# Patient Record
Sex: MALE | Race: WHITE
[De-identification: names, ages, dates, MRNs, and addresses within clinical notes are randomized per-mention and may not be internally consistent; named-entity substitution may affect disease eponyms.]

---

## 2017-08-12 NOTE — RADRPT
EXAM DATE/TIME:  08/12/2017 09:48 

 

[facility] COMPARISON:     

No previous studies available for comparison.

 

                     

INDICATIONS :     

Patient states defibrillator went off this morning.

                     

 

MEDICAL HISTORY :            

Cardiovascular disease   

 

SURGICAL HISTORY :        

Defibrillator

 

ENCOUNTER:     

Initial                                        

 

ACUITY:     

1 day      

 

PAIN SCORE:     

0/10

 

LOCATION:     

Bilateral chest 

 

FINDINGS:     

A single AP erect portable view of the chest was obtained and demonstrates a left subclavian AV seque
ntial transvenous pacer in place. The heart size is mildly prominent with no perihilar edema. There a
re no confluent infiltrates or effusions. The bony thorax is intact. There are mild atherosclerotic c
alcifications in the aorta.

 

CONCLUSION:     No acute disease.  

 

 

 

 Adam Pugh MD on August 12, 2017 at 10:24           

Board Certified Radiologist.

 This report was verified electronically.

## 2017-08-12 NOTE — PD
HPI


Chief Complaint:  Cardiac Complaint


Time Seen by Provider:  09:44


Travel History


International Travel<30 days:  No


Contact w/Intl Traveler<30days:  No


Traveled to known affect area:  No





History of Present Illness


HPI


This patient reports that he's been feeling dizzy and lightheaded for the last 

2 days.  He's had no syncope.  This morning he was feeling palpitations and 

dizzy and his defibrillator fired one time.  He has not had any chest pain.  He 

was at the office of his cardiologist Dr. Sher  3 days ago.  Symptoms 

moderately severe.  No alleviating factors.  He denies any syncope today.  

Patient knows he has cardiac rhythm problems but cannot be more specific.





PFSH


Past Medical History


Hx Anticoagulant Therapy:  Yes (WARFARIN DAILY)


Heart Rhythm Problems:  Yes


Cancer:  No


Cardiovascular Problems:  Yes (PACER AND DEFIB    CATH)


High Cholesterol:  No


Chemotherapy:  No


Chest Pain:  Yes


Congestive Heart Failure:  Yes


Cerebrovascular Accident:  No


Coronary Artery Disease:  Yes (PACER/DEFIB)


Diabetes:  No


Diminished Hearing:  No


Endocrine:  No


Gastrointestinal Disorders:  Yes


GERD:  Yes


Glaucoma:  No


Genitourinary:  No


Hepatitis:  No


Hiatal Hernia:  No


Hypertension:  Yes


Implanted Vascular Access Dvce:  Yes


Musculoskeletal:  Yes


Neurologic:  No


Psychiatric:  No


Reproductive:  No


Respiratory:  Yes (ASTHMA)


Integumentary:  No


Radiation Therapy:  No


Seizures:  No


Thyroid Disease:  No


Ulcer:  Yes





Past Surgical History


Abdominal Surgery:  No


AICD:  Yes


Cardiac Surgery:  Yes (PACE/DEFIB)


Eye Surgery:  Yes (BILAT CATARACT)


Genitourinary Surgery:  No


Thoracic Surgery:  No


Other Surgery:  Yes (SINIS)





Social History


Alcohol Use:  Yes (OCC)


Tobacco Use:  No


Substance Use:  No





Allergies-Medications


(Allergen,Severity, Reaction):  


Coded Allergies:  


     Nitrofurantoin (Verified  Allergy, Intermediate, Itching, 8/12/17)


Uncoded Allergies:  


     ANTIBIOTIC (Adverse Reaction, Intermediate, Rash, 8/12/17)


 PT STATE HE HAS A REACTION TO AN ANTIBIOTIC BUT CANNOT


 REMEMBER THE NAME


Reported Meds & Prescriptions





Reported Meds & Active Scripts


Active


Fioricet Tab (Acetaminophen/Butalbital/Caffeine) 1 Tab Tab 1-2 Tab PO Q6H PRN


Reported


Deltasone 10 Mg Tab (Prednisone) 10 Mg Tab 10 Mg PO DAILY 


Flexeril (Cyclobenzaprine HCl) 10 Mg Tab 10 Mg PO TID PRN


Fioricet Tab (Acetaminophen/Butalbital/Caffeine) 1 Tab Tab 1 Tab PO Q6H PRN


Lasix 20 Mg  Tab (Furosemide) 20 Mg Tab 20 Mg PO DAILY 


Omeprazole 20 Mg Tab 20 Mg PO DAILY 


Coumadin (Warfarin Sodium) 3 Mg Tab 1.5 Mg PO FRIDAY 


Coumadin (Warfarin Sodium) 3 Mg Tab 3 Mg PO DAILY EXCEPT FRIDAY 


Toprol Xl 25 mg Tab (Metoprolol Succinate) 25 Mg Tabcr 50 Mg PO HS 


Fosinopril Sodium 20 Mg Tab 20 Mg PO BID 


Spironolactone 25 Mg Tab 25 Mg PO BID 








Review of Systems


General / Constitutional:  No: Fever


Eyes:  No: Visual changes


HENT:  Positive: Lightheadedness,  No: Headaches


Cardiovascular:  Positive: Palpitations, Irregular Rhythm,  No: Chest Pain or 

Discomfort


Respiratory:  No: Shortness of Breath


Gastrointestinal:  No: Abdominal Pain


Genitourinary:  No: Dysuria


Musculoskeletal:  No: Pain


Skin:  No Rash


Neurologic:  Positive: Dizziness,  No: Weakness


Psychiatric:  No: Depression


Endocrine:  No: Polydipsia


Hematologic/Lymphatic:  No: Easy Bruising





Physical Exam


Narrative


GENERAL: Well-nourished, well-developed patient with lightheadedness.


SKIN: Focused skin assessment reveals no rash and nodules. Skin is Warm and dry.


HEAD: Atraumatic. Normocephalic. 


EYES: Pupils equal and round. No scleral icterus. No injection or drainage. 


ENT: No nasal bleeding or discharge.  Mucous membranes pink and moist.


NECK: Trachea midline. No JVD. 


CARDIOVASCULAR: Regular rate and rhythm with frequent ectopic beats.  No murmur 

appreciated.  Heart rate is 90


RESPIRATORY: No accessory muscle use. Clear to auscultation. Breath sounds 

equal bilaterally. 


GASTROINTESTINAL: Abdomen soft, non-tender, nondistended. Hepatic and splenic 

margins not palpable. 


MUSCULOSKELETAL: No obvious deformities. No clubbing.  No cyanosis.  No edema. 


NEUROLOGICAL: Awake and alert. No obvious cranial nerve deficits.  Motor 

grossly within normal limits. Normal speech.


PSYCHIATRIC: Appropriate mood and affect; insight and judgment normal.





Data


Data


Last Documented VS





Vital Signs








  Date Time  Temp Pulse Resp B/P Pulse Ox O2 Delivery O2 Flow Rate FiO2


 


8/12/17 11:19  78 14 113/59 99 Nasal Cannula 2 


 


8/12/17 09:31 97.9       








Orders





 Electrocardiogram (8/12/17 09:49)


Basic Metabolic Panel (Bmp) (8/12/17 09:49)


Complete Blood Count With Diff (8/12/17 09:49)


Magnesium (Mg) (8/12/17 09:49)


Prothrombin Time / Inr (Pt) (8/12/17 09:49)


Chest, Single Ap (8/12/17 09:49)


Ecg Monitoring (8/12/17 09:49)


Iv Access Insert/Monitor (8/12/17 09:49)


Oximetry (8/12/17 09:49)


Oxygen Administration (8/12/17 09:49)


Sodium Chloride 0.9% Flush (Ns Flush) (8/12/17 10:00)


Digoxin (8/12/17 09:59)


Metoprolol Tartrate (Lopressor) (8/12/17 10:15)


Blood Pressure (8/12/17 10:33)


Vital Signs (8/12/17 10:33)


Amiodarone Inj (Cordarone Inj) (8/12/17 10:45)


Amiodarone Inj (Cordarone Inj) (8/12/17 11:00)


Sodium Chloride 0.9% Flush (Ns Flush) (8/12/17 10:45)


Amiodarone Inj (Cordarone Inj) (8/12/17 10:45)


Admit Order (Ed Use Only) (8/12/17 11:15)


Electrocardiogram (8/12/17 )





Labs





 Laboratory Tests








Test 8/12/17





 09:55


 


White Blood Count 9.7 TH/MM3


 


Red Blood Count 4.42 MIL/MM3


 


Hemoglobin 15.5 GM/DL


 


Hematocrit 43.6 %


 


Mean Corpuscular Volume 98.7 FL


 


Mean Corpuscular Hemoglobin 35.1 PG


 


Mean Corpuscular Hemoglobin 35.6 %





Concent 


 


Red Cell Distribution Width 12.8 %


 


Platelet Count 273 TH/MM3


 


Mean Platelet Volume 8.7 FL


 


Neutrophils (%) (Auto) 66.8 %


 


Lymphocytes (%) (Auto) 21.1 %


 


Monocytes (%) (Auto) 10.8 %


 


Eosinophils (%) (Auto) 0.5 %


 


Basophils (%) (Auto) 0.8 %


 


Neutrophils # (Auto) 6.5 TH/MM3


 


Lymphocytes # (Auto) 2.0 TH/MM3


 


Monocytes # (Auto) 1.0 TH/MM3


 


Eosinophils # (Auto) 0.1 TH/MM3


 


Basophils # (Auto) 0.1 TH/MM3


 


CBC Comment DIFF FINAL 


 


Differential Comment  


 


Prothrombin Time 26.8 SEC


 


Prothromb Time International 2.3 RATIO





Ratio 


 


Sodium Level 137 MEQ/L


 


Potassium Level 4.2 MEQ/L


 


Chloride Level 103 MEQ/L


 


Carbon Dioxide Level 25.9 MEQ/L


 


Anion Gap 8 MEQ/L


 


Blood Urea Nitrogen 15 MG/DL


 


Creatinine 1.22 MG/DL


 


Estimat Glomerular Filtration 58 ML/MIN





Rate 


 


Random Glucose 89 MG/DL


 


Calcium Level 8.7 MG/DL


 


Magnesium Level 1.8 MG/DL


 


Digoxin Level 0.6 NG/ML











MDM


Medical Decision Making


Medical Screen Exam Complete:  Yes


Emergency Medical Condition:  Yes


Medical Record Reviewed:  Yes


Differential Diagnosis


Ventricular tachycardia, A. fib, SVT


Narrative Course


I have reviewed the patient's electronic medical record.





IV placed


CBC is normal


Metabolic profile is normal


INR on Coumadin is 2.3


Digoxin level 0.6


Magnesium ordered





I reviewed his EKG which shows sinus rhythm but very frequent PVCs.  He will 

string together 2-3 groups of PVCs.


Patient reports that his defibrillator fired.  We've called [employer] to come 

in and interrogate the device


I've placed a call to his cardiologist to discuss any antiarrhythmic therapy


At current time his pulse is 90 and his blood pressure is 156 systolic





I spoke with cardiologist coverage.  They recommended starting metoprolol 50 

twice a day now.  His med list includes no beta-blockade.





Patient had significant worsening at that point.  He went into frequent spells 

of wide complex ventricular tachycardia anywhere from 10 to the 40 beats.


They did spontaneously terminate


However his defibrillator did not fire during any these events


I spoke with cardiologist a second time.


I have initiated amiodarone drip per protocol


He will be admitted to [facility]


I spoke with hospitalist coverage as well


Critical Care Narrative


Aggregate critical care time was 35 minutes. Time to perform other separately 

billable procedures was not included in the critical care time. My time did not 

include minutes spent treating any other patients simultaneously or on 

activities that did not directly contribute to the patient's treatment.  





The services I provided to this patient were to treat and/or prevent clinically 

significant deterioration that could result in: Cardiopulmonary arrest, 

cardiogenic shock





I provided critical care services requiring my management, as noted below:


Chart data review, documentation time, medication orders and management, vital 

sign assessments/reviewing monitor data, ordering and reviewing lab tests, 

ordering and interpreting/reviewing x-rays and diagnostic studies, care of the 

patient and discussion of the patient with the admitting physicians.





Diagnosis





 Primary Impression:  


 Ventricular tachycardia


 Additional Impressions:  


 Defibrillator discharge


 Lightheadedness





Admitting Information


Admitting Physician Requests:  Admit








Troy Lin MD Aug 12, 2017 09:59

## 2017-08-12 NOTE — MB
cc:

KEREN HINES M.D., BARTON G. DO

****

 

 

DATE OF CONSULTATION:

08/12/2017

 

REASON FOR CONSULTATION:

Cardiology consultation.

 

IMPRESSION

1. Complex ventricular arrhythmias.  The patient received a defibrillator shock

     this morning.

2. Chronic atrial fibrillation, now sinus rhythm after his ICD shock this

     morning.

3. Hypertension.

4. History of congestive heart failure.  The patient tells me he does not have

     atherosclerotic heart disease.

5. History of peptic ulcer disease.

6. Chronic obstructive pulmonary disease with a history of asthma.

7. Chronic warfarin therapy.

 

RECOMMENDATIONS

The patient has been admitted to the hospital.  We have started a amiodarone

drip.  This will be changed to p.o. amiodarone magnesium level was noted to be

1.8.  He will be started on magnesium supplementation.  Will continue warfarin.

INR 2.5-3.5.

 

CLINICAL DATA

Mr. Townsend is a 76-year-old male who was admitted to the emergency room with

complaints of a defibrillator shock.  He apparently was sitting on the couch

this morning when he shock according to his daughter that knocked him off the

couch. I was contacted, the patient still felt dizzy after his one shock, I

advised him to go to the emergency room. We just finished  interrogating his

device and he has had multiple runs of nonsustained ventricular tachycardia.

Many episodes have been pace terminated.  The patient does not know his

ejection fraction he does not know the etiology of this cardiomyopathy.  He

denies a history of myocardial infarction.  He has had no stents or bypass

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  He says Dr. Hines his primary cardiologist has told him that he does have congestive heart

failure.  He apparently saw Dr. Hines recently and had a recent

echocardiogram.

 

ALLERGIES

NITROFURANTOIN

UNSPECIFIED ANTIBIOTIC.

 

MEDICATIONS:

1. At the time of admission included Fioricet

2. prednisone 10 mg daily,

3. Flexeril

4. Furosemide 20 daily

5. Omeprazole 20 daily,

6. Warfarin 9 mg every day except 4-1/2 mg on Friday,

7. metoprolol 25 in the morning and 50 in the evening.

8. Fosinopril 20 b.i.d.

9. Spironolactone 25 b.i.d.

 

HISTORY:

He denies a history of seizure, stroke or TIA.

He has a history of smoking in the remote past, quit over 40 years ago.  He

apparently drinks three to five alcoholic drinks per day.  He and has no

history of asthma.  He has a history of peptic ulcer disease but he has had no

recent bleeding complications from warfarin.

 

He had elevated liver function tests atorvastatin which was discontinued.  He

has no history of kidney problems or thyroid problems.  He has had no recent

chest pain.  He had no syncope.  He did feel dizzy after the shock this morning

but not before the shock. He has had no lower extremity edema. He has had no

abdominal or back pain.

 

PHYSICAL EXAMINATION

IN GENERAL: At this time demonstrates an alert oriented male laying in bed no

apparent distress.  He is in a sinus rhythm / bigeminal rhythm.

VITAL SIGNS: Blood pressure 153/85, heart rate is about 90 at this point.

Saturations 99%.

HEAD, EYES, EARS, NOSE, AND THROAT: Exam anicteric sclerae.  Jugular venous

pressures are not elevated.  No definite bruits.  Noted.

LUNGS: He has clear lung fields.

CARDIAC: On cardiac exam there is a regular rate and rhythm.  There is a fourth

heart sound present.  He has bigeminal rhythm during part of the time.

ABDOMEN:  the abdomen is soft, nontender.

EXTREMITIES: The extremities are free of cyanosis, clubbing, edema.

 

RADIOLOGIC:

A 12-lead electrocardiogram demonstrates sinus rhythm.  There are ventricular

couplets and triplets some multiform there are nonspecific ST-T changes.  There

is a left atrial abnormality.  There is a intermittent ventricular pacing.

 

LABORATORY FINDINGS

White cell count 9,700, hematocrit 44%, platelet count 273, lytes 137, 4.2,

103, 25.9, BUN 15, creatinine 1.2, magnesium 1.8.

 

A single view chest x-ray is unremarkable.

 

Cardiac enzymes are pending.

 

DISCUSSION

The 76-year-old male presumably with a nonischemic dilated cardiomyopathy and

complex of ventricular arrhythmias.  He did have chronic atrial fibrillation on

warfarin and is now in a sinus rhythm after receiving an appropriate shock this

morning.

 

We have reprogrammed his ICD to a DDD mode with a lower rate limit of 70 pulses

per minute a monitoring zone was also put on 130 beats per minute.  The patient

needs to keep his potassium above 4.5, he needs to keep magnesium level above

2.

 

The patient has been started on IV amiodarone with significant decrease in his

ectopy will begin p.o. amiodarone.

 

 

 

                              _________________________________

                              DO Bright Gil

D:  8/12/2017/12:41 PM

T:  8/12/2017/1:13 PM

Visit #:  [id number]

Job #:  [id number]

## 2017-08-12 NOTE — HHI.HP
__________________________________________________





HPI


Service


[facility]


Primary Care Physician


Warren Spencer MD


Admission Diagnosis


wide complex V tach, defib firing


Diagnoses:  


Chief Complaint:  


Dizziness


Lightheadedness


Defibrillator firing


Travel History


International Travel<30 Days:  No


Contact w/Intl Traveler <30 Da:  No


Traveled to Known Affected Are:  No


History of Present Illness


This is a 75 yo male with a past medical history significant for atrial 

fibrillation on Coumadin, nonischemic cardiomyopathy, hypertension, CHF, GERD, 

PUD and asthma who presents to [facility] ED with complaints of 

lightheadedness, dizziness and irregular heart beats for the past month that 

have increased in intensity and frequency over the past 2 days with associated 

defibrillator firing earlier today.  He denies any syncopal episodes.  Patient 

denies any associated chest pain or shortness of breath.  He denies any 

increased swelling in his legs or feet.  He cannot endorse any aggravating or 

alleviating factors.  He denies any recent illness, fever or chills.  He denies 

any associated nausea, vomiting or abdominal pain.  He denies any hematuria, 

dysuria, diarrhea or constipation.  He does admit to drinking alot of alcohol 

last night and eating chicken wings.  Initially in the ED, his EKG showed sinus 

rhythm with frequent PVCs.  Cardiology was contacted and per their 

recommendations was started on Metoprolol 50mg BID.  [employer] was contacted to 

interrogate the patients defibrillator device.  Patient worsened with frequent 

spells of wide complex ventricular tachycardia from 10 to 40 beats which 

spontaneously terminated.  Per ED's note, his defibrillator did not fire during 

any of these events.  ED discussed with cardiologist again and amiodarone drip 

per protocol was initiated.  Patient had some complaints of left shoulder pain 

that improved after the HOB was repositioned.  Per patient report, he was seen 

at his cardiologist office Dr. Sher 3 days ago and underwent an 

echocardiogram.





Review of Systems


Except as stated in HPI:  all other systems reviewed are Neg





Past Family Social History


Past Medical History


Atrial fibrillation on Coumadin


Nonischemic cardiomyopathy


HTN


CHF with EF 25-30%


GERD


PUD


Asthma


Past Surgical History


AICD


Bilateral cataract sx


Sinus sx


Back surgery x 2


Vasectomy


Tonsillectomy


TURP


Reported Medications


Fioricet Tab (Acetaminophen/Butalbital/Caffeine) 1 Tab Tab 1-2 Tab PO Q6H PRN


Deltasone 10 Mg Tab (Prednisone) 10 Mg Tab 10 Mg PO DAILY 


Flexeril (Cyclobenzaprine HCl) 10 Mg Tab 10 Mg PO TID PRN


Fioricet Tab (Acetaminophen/Butalbital/Caffeine) 1 Tab Tab 1 Tab PO Q6H PRN


Lasix 20 Mg  Tab (Furosemide) 20 Mg Tab 20 Mg PO DAILY 


Omeprazole 20 Mg Tab 20 Mg PO DAILY 


Coumadin (Warfarin Sodium) 3 Mg Tab 1.5 Mg PO FRIDAY 


Coumadin (Warfarin Sodium) 3 Mg Tab 3 Mg PO DAILY EXCEPT FRIDAY 


Toprol Xl 25 mg Tab (Metoprolol Succinate) 25 Mg Tabcr 50 Mg PO HS 


Fosinopril Sodium 20 Mg Tab 20 Mg PO BID 


Spironolactone 25 Mg Tab 25 Mg PO BID


Allergies:  


Coded Allergies:  


     Nitrofurantoin (Verified  Allergy, Intermediate, Itching, 8/12/17)


Uncoded Allergies:  


     ANTIBIOTIC (Adverse Reaction, Intermediate, Rash, 8/12/17)


 PT STATE HE HAS A REACTION TO AN ANTIBIOTIC BUT CANNOT


 REMEMBER THE NAME


Active Ordered Medications





 Current Medications








 Medications


  (Trade)  Dose


 Ordered  Sig/Rosibel


 Route  Start Time


 Stop Time Status Last Admin


 


 Amiodarone HCl


 450 mg/Dextrose  250 ml @ 0


 mls/hr  CONTINUOUS


 IV  8/12/17 10:45


    8/12/17 11:34


 


 


  (NS 1000 ml Inj)  1,000 ml @ 


 83 mls/hr  Q12H3M


 IV  8/12/17 12:00


     


 


 


  (NS Flush)  2 ml  UNSCH  PRN


 IV FLUSH  8/12/17 11:30


     


 


 


  (NS Flush)  2 ml  BID


 IV FLUSH  8/12/17 21:00


     


 


 


  (Tylenol)  650 mg  Q4H  PRN


 PO  8/12/17 11:30


     


 


 


  (Zofran Inj)  4 mg  Q6H  PRN


 IVP  8/12/17 11:30


     


 


 


  (Narcan Inj)  0.4 mg  UNSCH  PRN


 IV  8/12/17 11:30


     


 


 


  (Mer-Colace)  1 tab  BID


 PO  8/12/17 21:00


     


 


 


  (Milk Of


 Magnesia Liq)  30 ml  Q12H  PRN


 PO  8/12/17 11:30


     


 


 


  (Senokot)  17.2 mg  Q12H  PRN


 PO  8/12/17 11:30


     


 


 


  (Dulcolax Supp)  10 mg  DAILY  PRN


 RECTAL  8/12/17 11:30


     


 


 


  (Lactulose Liq)  30 ml  DAILY  PRN


 PO  8/12/17 11:30


     


 








Family History


Significant FMHX of coronary artery disease.  Mother, MI, Brother, MI and CVA, 

Son, MI at age 43, previous CABG


Social History


Patient has a remote history of tobacco use of 1ppd starting when he was a 

teenager but quit 40 years ago.


Patient reports alcohol consumption of 3 to 4 bloody bryan's or glasses of wine 

nightly.


Patient denies any illicit drug use.





Physical Exam


Vital Signs





 Vital Signs








  Date Time  Temp Pulse Resp B/P Pulse Ox O2 Delivery O2 Flow Rate FiO2


 


8/12/17 11:34  77  123/70    


 


8/12/17 11:19  78 14 113/59 99 Nasal Cannula 2 


 


8/12/17 11:01  87  99/70    


 


8/12/17 10:47  102 18 153/85 100   


 


8/12/17 10:17  90   99 Nasal Cannula 2 


 


8/12/17 09:31 97.9 112 24 144/87 97 Room Air  








Physical Exam


GENERAL: This is a well-nourished, well-developed patient, in no apparent 

distress.  Awake and alert.  Appears comfortable.


SKIN: No rashes, ecchymoses or lesions. Warm and dry.


HEAD: Atraumatic. Normocephalic. No temporal or scalp tenderness.


EYES: Pupils equal round and reactive. Extraocular motions intact. No scleral 

icterus. No injection or drainage. 


ENT: Nose without bleeding or purulent drainage. Throat without erythema, 

tonsillar hypertrophy or exudate. Uvula midline. Airway patent.


NECK: Trachea midline. No lymphadenopathy. Supple, nontender, no meningeal 

signs.


CARDIOVASCULAR:Irregular without murmurs, gallops, or rubs. 


RESPIRATORY: Clear to auscultation. Breath sounds equal bilaterally. No wheezes

, rales, or rhonchi.  


GASTROINTESTINAL: Abdomen soft, non-tender, nondistended. No hepato-splenomegaly

, or palpable masses. No guarding.


MUSCULOSKELETAL: Extremities without clubbing or cyanosis. Trace bilateral 

lower extremity edema.  No joint tenderness, effusion, or edema noted. No calf 

tenderness. 


NEUROLOGICAL: Awake and alert. Able to move all extremities.  No focal 

neurologic findings appreciated.  Normal speech.


Laboratory





Laboratory Tests








Test 8/12/17





 09:55


 


White Blood Count 9.7 


 


Red Blood Count 4.42 


 


Hemoglobin 15.5 


 


Hematocrit 43.6 


 


Mean Corpuscular Volume 98.7 


 


Mean Corpuscular Hemoglobin 35.1 


 


Mean Corpuscular Hemoglobin 35.6 





Concent 


 


Red Cell Distribution Width 12.8 


 


Platelet Count 273 


 


Mean Platelet Volume 8.7 


 


Neutrophils (%) (Auto) 66.8 


 


Lymphocytes (%) (Auto) 21.1 


 


Monocytes (%) (Auto) 10.8 


 


Eosinophils (%) (Auto) 0.5 


 


Basophils (%) (Auto) 0.8 


 


Neutrophils # (Auto) 6.5 


 


Lymphocytes # (Auto) 2.0 


 


Monocytes # (Auto) 1.0 


 


Eosinophils # (Auto) 0.1 


 


Basophils # (Auto) 0.1 


 


CBC Comment DIFF FINAL 


 


Differential Comment  


 


Prothrombin Time 26.8 


 


Prothromb Time International 2.3 





Ratio 


 


Sodium Level 137 


 


Potassium Level 4.2 


 


Chloride Level 103 


 


Carbon Dioxide Level 25.9 


 


Anion Gap 8 


 


Blood Urea Nitrogen 15 


 


Creatinine 1.22 


 


Estimat Glomerular Filtration 58 





Rate 


 


Random Glucose 89 


 


Calcium Level 8.7 


 


Magnesium Level 1.8 


 


Digoxin Level 0.6 








Result Diagram:  


8/12/17 0955                                                                   

             8/12/17 0955





Imaging





Last Impressions








Chest X-Ray 8/12/17 0949 Signed





Impressions: 





 Service Date/Time:  Saturday, August 12, 2017 09:48 - CONCLUSION: No acute 





 disease.       Adam Pugh MD 











Assessment and Plan


Assessment and Plan


75 yo male with a past medical history significant for atrial fibrillation on 

Coumadin, hypertension, CHF, GERD, PUD and asthma who presents to [facility] ED with complaints of lightheadedness, dizziness and irregular heart 

beats for the past month that have increased in intensity and frequency over 

the past 2 days with associated defibrillator firing earlier today.





Ventricular tachycardia


Defibrillator discharge


Consult cardiology


Continue amiodarone drip


K 4.2, Mag 1.8


Digoxin level 0.6


Cycle cardiac enzymes


Continuous cardiac monitoring


[employer] contacted to interrogate device





Atrial fibrillation on Coumadin


CHF, not in acute exacerbation


Nonischemic cardiomyopathy


HTN


Continue on home dose of Coumadin.  INR 2.3.  Continue to monitor INR.  

Pharmacy to dose.


Monitor for signs of fluid overload.  CXR personally reviewed shows no e/o 

edema or effusions.  Trace lower extremity edema.  Satting 99% on 2L.


Hold home antihypertensive meds for now as patient is normotensive


Continue on home dose of Digoxin


Hold home Torsemide and Aldactone


monitor I&Os


last echocardiogram in the system dated 1/13/14 revealed severely reduced EF 25-

30%, diffuse hypokinesis.  Patient states he recently had echocardiogram done 

on Wednesday at his cardiologist office.





Asthma, not in acute exacerbation


Duonebs as needed





HLD


Continue home Lipitor 10mg daily





DVT prophylaxis


Patient is on Coumadin





Discussed with patient, family and Dr. Johnson


Code Status


Full Code


Discussed Condition With


Discussed with patient in ER and with his Daughter Miss Fitzpatrick


Discussed with Cardiology specialist, asked me to continue Warfarin and 

Amiodarone by mouth 400 mg BID.





Attending Statement


The exam, history, and the medical decision-making described in the above note 

were completed with the assistance of the mid-level provider. I reviewed and 

agree with the findings presented.  I attest that I had a face-to-face 

encounter with the patient on the same day, and personally performed and 

documented my assessment and findings in the medical record.








Kesha Hernandez Aug 12, 2017 12:33


Cosmo Aranda MD Aug 12, 2017 13:49

## 2017-08-13 NOTE — HHI.PR
Subjective


Remarks


This is a pleasant 75 y/o male who came to ER after Defibrillator firing in am 

yesterday. 


Seen by Cardiology specialist Doctor Nichole Horne, with Diagnosis of 

Complex Ventricular arrhythmias, received a defibrillator shock 


Chronic Atrial Fibrillation, now sinus rhythm, after ICD shock, Hypertension, 

PUD, COPD, chronic Warfarin therapy. 


Admitted and started on Amiodarone drip, he will change to by mouth amiodarone, 

the Defibrillator was reprogrammed his ICD


to a DDD mode with lower rate limit of 70 pulses per minute a monitoring zone 

was also put on 130 beats per minute, keep potassium


above 4.5 Magnesium level above 2. Seen in his bedroom, discussed with patient 

and his Nurse Miss Medel his INR 1.8 subtherapeutic in 


a patient with CHADS-VASc Score of 4 will need to receive LMWH 1mg per kilogram 

every 12 hours SQ starting today. 


stable in his bedroom, no nausea, vomit or diarrhea, and sinus rhythm on 

monitor at this time.





Objective





 Vital Signs








  Date Time  Temp Pulse Resp B/P Pulse Ox O2 Delivery O2 Flow Rate FiO2


 


8/13/17 09:00  69      


 


8/13/17 08:00  79      


 


8/13/17 07:00  78      


 


8/13/17 07:00 97.4 70 20 107/77 98   


 


8/13/17 02:50  75      


 


8/13/17 02:00  70      


 


8/13/17 01:16 98.2 69 20 101/64 96   


 


8/13/17 01:00  68      


 


8/13/17 00:00  72      


 


8/12/17 23:00  75      


 


8/12/17 22:00  78      


 


8/12/17 21:00  70      


 


8/12/17 20:08        21


 


8/12/17 20:00 98.6 66 20 115/72 97   


 


8/12/17 20:00  72      


 


8/12/17 19:00  77      


 


8/12/17 18:00  69      


 


8/12/17 17:05  73      


 


8/12/17 17:05 97.5 69 20 138/93 100   


 


8/12/17 13:00  82 17 119/73 99 Nasal Cannula 2 


 


8/12/17 12:28  83 17 116/78 100 Nasal Cannula  


 


8/12/17 11:34  77  123/70    


 


8/12/17 11:30  76 17 123/70 99 Nasal Cannula 2 


 


8/12/17 11:19  78 14 113/59 99 Nasal Cannula 2 


 


8/12/17 11:01  87  99/70    


 


8/12/17 10:47  102 18 153/85 100   


 


8/12/17 10:17  90   99 Nasal Cannula 2 


 


8/12/17 09:31 97.9 112 24 144/87 97 Room Air  








 I/O








 8/12/17 8/12/17 8/12/17 8/13/17 8/13/17 8/13/17





 07:00 15:00 23:00 07:00 15:00 23:00


 


Intake Total   783 ml   


 


Balance   783 ml   


 


      


 


Intake Oral   240 ml   


 


IV Total   543 ml   


 


# Voids   1   








Result Diagram:  


8/13/17 0639                                                                   

             8/13/17 0639





Imaging





Last Impressions








Chest X-Ray 8/12/17 0949 Signed





Impressions: 





 Service Date/Time:  Saturday, August 12, 2017 09:48 - CONCLUSION: No acute 





 disease.       Adam Pugh MD 








Procedures


None


Other Results





 Laboratory Tests








Test 8/12/17 8/12/17 8/13/17





 09:55 18:50 06:39


 


Magnesium Level 1.8 MG/DL  


 


Digoxin Level 0.6 NG/ML  


 


Total Creatine Kinase  87 U/L 


 


Troponin I  0.05 NG/ML 


 


Thyroid Stimulating Hormone  1.340 uIU/ML 





3rd Gen   


 


White Blood Count   8.0 TH/MM3


 


Red Blood Count   3.97 MIL/MM3


 


Hemoglobin   13.6 GM/DL


 


Hematocrit   39.9 %


 


Mean Corpuscular Volume   100.6 FL


 


Mean Corpuscular Hemoglobin   34.3 PG


 


Mean Corpuscular Hemoglobin   34.2 %





Concent   


 


Red Cell Distribution Width   12.8 %


 


Platelet Count   255 TH/MM3


 


Mean Platelet Volume   8.4 FL


 


Neutrophils (%) (Auto)   67.4 %


 


Lymphocytes (%) (Auto)   19.3 %


 


Monocytes (%) (Auto)   11.3 %


 


Eosinophils (%) (Auto)   1.1 %


 


Basophils (%) (Auto)   0.9 %


 


Neutrophils # (Auto)   5.4 TH/MM3


 


Lymphocytes # (Auto)   1.6 TH/MM3


 


Monocytes # (Auto)   0.9 TH/MM3


 


Eosinophils # (Auto)   0.1 TH/MM3


 


Basophils # (Auto)   0.1 TH/MM3


 


CBC Comment   DIFF FINAL 


 


Differential Comment    


 


Prothrombin Time   21.0 SEC


 


Prothromb Time International   1.8 RATIO





Ratio   


 


Sodium Level   139 MEQ/L


 


Potassium Level   3.9 MEQ/L


 


Chloride Level   108 MEQ/L


 


Carbon Dioxide Level   21.6 MEQ/L


 


Anion Gap   9 MEQ/L


 


Blood Urea Nitrogen   17 MG/DL


 


Creatinine   1.17 MG/DL


 


Estimat Glomerular Filtration   60 ML/MIN





Rate   


 


Random Glucose   83 MG/DL


 


Calcium Level   8.4 MG/DL








Objective Remarks


GENERAL: Well developed in no acute distress. 


SKIN: No rashes, ecchymoses or lesions. Warm and dry.


HEAD: Atraumatic. Normocephalic. No temporal or scalp tenderness.


EYES: Pupils equal round and reactive. Extraocular motions intact. No scleral 

icterus. No injection or drainage. 


ENT: Nose without bleeding or purulent drainage. Throat without erythema, 

tonsillar hypertrophy or exudate. Uvula midline. Airway patent.


NECK: Trachea midline. No lymphadenopathy. Supple, nontender, no meningeal 

signs.


CARDIOVASCULAR: Regular rate and rhythm. no murmurs. 


RESPIRATORY: Clear to auscultation. Breath sounds equal bilaterally. No wheezes

, rales, or rhonchi.  


GASTROINTESTINAL: Abdomen soft, non-tender, nondistended. No hepato-splenomegaly

, or palpable masses. No guarding.


MUSCULOSKELETAL: Extremities without clubbing or cyanosis. Trace bilateral 

lower extremity edema.  No joint tenderness, effusion, or edema noted. No calf 

tenderness. 


NEUROLOGICAL: Awake and alert. Able to move all extremities.  No focal 

neurologic findings appreciated.  Normal speech.


Medications and IVs





 Current Medications








 Medications


  (Trade)  Dose


 Ordered  Sig/Rosibel


 Route  Start Time


 Stop Time Status Last Admin


 


  (NS 1000 ml Inj)  1,000 ml @ 


 83 mls/hr  Q12H3M


 IV  8/12/17 12:00


    8/12/17 12:28


 


 


  (NS Flush)  2 ml  UNSCH  PRN


 IV FLUSH  8/12/17 11:30


     


 


 


  (NS Flush)  2 ml  BID


 IV FLUSH  8/12/17 21:00


    8/13/17 08:39


 


 


  (Tylenol)  650 mg  Q4H  PRN


 PO  8/12/17 11:30


    8/12/17 16:29


 


 


  (Zofran Inj)  4 mg  Q6H  PRN


 IVP  8/12/17 11:30


     


 


 


  (Narcan Inj)  0.4 mg  UNSCH  PRN


 IV  8/12/17 11:30


     


 


 


  (Mer-Colace)  1 tab  BID


 PO  8/12/17 21:00


    8/13/17 08:38


 


 


  (Milk Of


 Magnesia Liq)  30 ml  Q12H  PRN


 PO  8/12/17 11:30


     


 


 


  (Senokot)  17.2 mg  Q12H  PRN


 PO  8/12/17 11:30


     


 


 


  (Dulcolax Supp)  10 mg  DAILY  PRN


 RECTAL  8/12/17 11:30


     


 


 


  (Lactulose Liq)  30 ml  DAILY  PRN


 PO  8/12/17 11:30


     


 


 


  (Lipitor)  10 mg  HS


 PO  8/12/17 21:00


    8/12/17 22:27


 


 


  (Lanoxin)  0.125 mg  DAILY


 PO  8/13/17 09:00


    8/13/17 08:39


 


 


 Magnesium Oxide


 400 mg  400 mg  DAILY


 PO  8/13/17 09:00


    8/13/17 08:39


 


 


  (Coumadin


 Consult Pharmacy)  0 ml @ 0


 mls/hr  UNSCH


 OTHER  8/12/17 13:00


     


 


 


  (Coumadin)  3 mg  DAILY@1600


 PO  8/12/17 16:00


    8/12/17 16:28


 


 


  (Ambien)  5 mg  HS  PRN


 PO  8/12/17 16:45


    8/12/17 22:27


 


 


  (Cordarone)  400 mg  BID


 PO  8/12/17 21:00


    8/13/17 08:38


 


 


  (Coumadin)  4 mg  ONCE  ONCE


 PO  8/13/17 16:00


 8/13/17 16:01   


 


 


  (Coumadin


 Booklet)  1  ONCE  ONCE


 OTHER  8/13/17 16:00


 8/13/17 16:01   


 











A/P


Assessment and Plan


75 yo male with a past medical history significant for atrial fibrillation on 

Coumadin, hypertension, CHF, GERD, PUD and asthma who presents to [facility] ED with complaints of lightheadedness, dizziness and irregular heart 

beats for the past month that have increased in intensity and frequency over 

the past 2 days with associated defibrillator firing earlier today.





Ventricular tachycardia


Defibrillator discharge


Seen by Cardiology specialist Doctor Nichole Horne, with Diagnosis of 

Complex Ventricular arrhythmias, received a defibrillator shock 


Chronic Atrial Fibrillation, now sinus rhythm, after ICD shock, Hypertension, 

PUD, COPD, chronic Warfarin therapy. 


Admitted and started on Amiodarone drip, he will change to by mouth amiodarone, 

the Defibrillator was reprogrammed his ICD


to a DDD mode with lower rate limit of 70 pulses per minute a monitoring zone 

was also put on 130 beats per minute, keep potassium


above 4.5 Magnesium level above 2. Seen in his bedroom, discussed with patient 

and his Nurse Miss Medel his INR 1.8 subtherapeutic in 


a patient with CHADS-VASc Score of 4 will need to receive LMWH 1mg per kilogram 

every 12 hours SQ starting today. 


stable in his bedroom, no nausea, vomit or diarrhea, and sinus rhythm on 

monitor at this time. 





Atrial fibrillation on Coumadin


CHF, not in acute exacerbation


Nonischemic cardiomyopathy


HTN


Continue on home dose of Coumadin.  INR 1.8 discussed with Pharmacy due to CHADS

-VASc score of 4 started on Lovenox bridging


Discontinued IV fluids to avoid volume overload. .


Continue on home dose of Digoxin


Hold home Torsemide and Aldactone


monitor I&Os


last echocardiogram in the system dated 1/13/14 revealed severely reduced EF 25-

30%, diffuse hypokinesis.  Patient states he recently had echocardiogram done 

on Wednesday at his cardiologist office.





Asthma, not in acute exacerbation


Duonebs as needed





HLD


Continue home Lipitor 10mg daily





DVT prophylaxis


Patient is on Coumadin Lovenox





Discussed with patient, family and Dr. Johnson


Code Status


Full Code


Discharge Planning


Once cleared by Cardiology specialist, will need bridging with Lovenox for INR 

subtherapeutic today.








Cosmo Aranda MD Aug 13, 2017 09:19

## 2017-08-13 NOTE — PD.CARD.PN
Objective


Vital Signs / I&O





 Vital Signs








  Date Time  Temp Pulse Resp B/P Pulse Ox O2 Delivery O2 Flow Rate FiO2


 


8/13/17 15:00  69      


 


8/13/17 15:00 98.8 69 20 159/95 98   


 


8/13/17 14:00  69      


 


8/13/17 13:00  69      


 


8/13/17 12:00  69      


 


8/13/17 11:00 98.2 70 20 124/72 98   


 


8/13/17 11:00  69      


 


8/13/17 10:00  69      


 


8/13/17 09:00  69      


 


8/13/17 08:00  79      


 


8/13/17 07:00  78      


 


8/13/17 07:00 97.4 70 20 107/77 98   


 


8/13/17 02:50  75      


 


8/13/17 02:00  70      


 


8/13/17 01:16 98.2 69 20 101/64 96   


 


8/13/17 01:00  68      


 


8/13/17 00:00  72      


 


8/12/17 23:00  75      


 


8/12/17 22:00  78      


 


8/12/17 21:00  70      


 


8/12/17 20:08        21


 


8/12/17 20:00 98.6 66 20 115/72 97   


 


8/12/17 20:00  72      


 


8/12/17 19:00  77      


 


8/12/17 18:00  69      


 


8/12/17 17:05  73      


 


8/12/17 17:05 97.5 69 20 138/93 100   








 I/O








 8/12/17 8/12/17 8/12/17 8/13/17 8/13/17 8/13/17





 06:59 14:59 22:59 06:59 14:59 22:59


 


Intake Total   783 ml   


 


Balance   783 ml   


 


      


 


Intake Oral   240 ml   


 


IV Total   543 ml   


 


# Voids   1   








Laboratory





Laboratory Tests








Test 8/12/17 8/13/17





 18:50 06:39


 


Total Creatine Kinase 87 U/L 


 


Troponin I 0.05 NG/ML 


 


Thyroid Stimulating Hormone 1.340 uIU/ML 





3rd Gen  


 


White Blood Count  8.0 TH/MM3


 


Red Blood Count  3.97 MIL/MM3


 


Hemoglobin  13.6 GM/DL


 


Hematocrit  39.9 %


 


Mean Corpuscular Volume  100.6 FL


 


Mean Corpuscular Hemoglobin  34.3 PG


 


Mean Corpuscular Hemoglobin  34.2 %





Concent  


 


Red Cell Distribution Width  12.8 %


 


Platelet Count  255 TH/MM3


 


Mean Platelet Volume  8.4 FL


 


Neutrophils (%) (Auto)  67.4 %


 


Lymphocytes (%) (Auto)  19.3 %


 


Monocytes (%) (Auto)  11.3 %


 


Eosinophils (%) (Auto)  1.1 %


 


Basophils (%) (Auto)  0.9 %


 


Neutrophils # (Auto)  5.4 TH/MM3


 


Lymphocytes # (Auto)  1.6 TH/MM3


 


Monocytes # (Auto)  0.9 TH/MM3


 


Eosinophils # (Auto)  0.1 TH/MM3


 


Basophils # (Auto)  0.1 TH/MM3


 


CBC Comment  DIFF FINAL 


 


Differential Comment   


 


Prothrombin Time  21.0 SEC


 


Prothromb Time International  1.8 RATIO





Ratio  


 


Sodium Level  139 MEQ/L


 


Potassium Level  3.9 MEQ/L


 


Chloride Level  108 MEQ/L


 


Carbon Dioxide Level  21.6 MEQ/L


 


Anion Gap  9 MEQ/L


 


Blood Urea Nitrogen  17 MG/DL


 


Creatinine  1.17 MG/DL


 


Estimat Glomerular Filtration  60 ML/MIN





Rate  


 


Random Glucose  83 MG/DL


 


Calcium Level  8.4 MG/DL











Assessment and Plan


Assessment and Plan


PT STABLE NO MORE SHOCKS


NO SAMY CURRENTLY


OFF IV AMIO


BP UP


PE SAME


WARFARIN DOSE INCREASED FOR INR 1.8


WILL DC DIGOXIN


START LO DOSE B BLOCKER


WOULD CONT AMIO 800/DAY FOR A WEEK 


THE V  A DAY FOR 2 MORE WEEKS








Ozzie Varela DO Aug 13, 2017 15:56

## 2017-08-14 NOTE — EKG
Date Performed: 08/13/2017       Time Performed: 16:07:42

 

PTAGE:      76 years

 

EKG:      Atrial pacing Possible anterior infarct - age undetermined Inferior/lateral T wave changes 
are nonspecific Abnormal ECG

 

PREVIOUS TRACING        08/12/2017 11.26.36 Compared to previous tracing, patient now shows atrial pa
cing and occasional native beats.

 

DOCTOR:   Teressa Manning  Interpretating Date/Time  08/14/2017 16:04:38

## 2017-08-14 NOTE — EKG
Date Performed: 08/12/2017       Time Performed: 09:53:32

 

PTAGE:      76 years

 

EKG:      Sinus rhythm 

 

 WITH FREQUENT VENTRICULAR PREMATURE COMPLEXES NONSPECIFIC ST & T-WAVE ABNORMALITY, unchanged from pr
ior tracing Two, three and four beat runs of ventricular rhythm are now noted Clinical correlation is
 strongly recommended ABNORMAL RHYTHM ECG

 

PREVIOUS TRACING       : 01/11/2014 10.19

 

DOCTOR:   Buddy Lee  Interpretating Date/Time  08/14/2017 09:57:50

## 2017-08-14 NOTE — HHI.DS
__________________________________________________





Discharge Summary


Admission Date


Aug 13, 2017 at 14:23


Discharge Date:  Aug 14, 2017


Admitting Diagnosis


wide complex V tach, defib firing





(1) Ventricular tachycardia


ICD Code:  I47.2


(2) Defibrillator discharge


ICD Code:  Z45.02


Procedures


None


Brief History - From Admission


This is a 77 yo male with a past medical history significant for atrial 

fibrillation on Coumadin, nonischemic cardiomyopathy, hypertension, CHF, GERD, 

PUD and asthma who presents to [facility] ED with complaints of 

lightheadedness, dizziness and irregular heart beats for the past month that 

have increased in intensity and frequency over the past 2 days with associated 

defibrillator firing earlier today.  He denies any syncopal episodes.  Patient 

denies any associated chest pain or shortness of breath.  He denies any 

increased swelling in his legs or feet.  He cannot endorse any aggravating or 

alleviating factors.  He denies any recent illness, fever or chills.  He denies 

any associated nausea, vomiting or abdominal pain.  He denies any hematuria, 

dysuria, diarrhea or constipation.  He does admit to drinking alot of alcohol 

last night and eating chicken wings.  Initially in the ED, his EKG showed sinus 

rhythm with frequent PVCs.  Cardiology was contacted and per their 

recommendations was started on Metoprolol 50mg BID.  [employer] was contacted to 

interrogate the patients defibrillator device.  Patient worsened with frequent 

spells of wide complex ventricular tachycardia from 10 to 40 beats which 

spontaneously terminated.  Per ED's note, his defibrillator did not fire during 

any of these events.  ED discussed with cardiologist again and amiodarone drip 

per protocol was initiated.  Patient had some complaints of left shoulder pain 

that improved after the HOB was repositioned.  Per patient report, he was seen 

at his cardiologist office Dr. Sher 3 days ago and underwent an 

echocardiogram.


CBC/BMP:  


8/13/17 0639                                                                   

             8/14/17 0536





Significant Findings





Laboratory Tests








Test 8/12/17 8/13/17 8/14/17 8/14/17





 09:55 06:39 05:30 05:36


 


Red Blood Count 4.42 MIL/MM3 3.97 MIL/MM3  





 (4.50-5.90) (4.50-5.90)  


 


Mean Corpuscular Hemoglobin 35.1 PG 34.3 PG  





 (27.0-34.0) (27.0-34.0)  


 


Monocytes (%) (Auto) 10.8 % 11.3 %  





 (0.0-8.0) (0.0-8.0)  


 


Monocytes # (Auto) 1.0 TH/MM3   





 (0-0.9)   


 


Prothrombin Time 26.8 SEC 21.0 SEC 21.7 SEC 





 (9.8-11.6) (9.8-11.6) (9.8-11.6) 


 


Estimat Glomerular Filtration 58 ML/MIN (>89) 60 ML/MIN (>89)  58 ML/MIN (>89)





Rate    


 


Digoxin Level 0.6 NG/ML   





 (0.8-2.0)   


 


Mean Corpuscular Volume  100.6 FL  





  (80.0-100.0)  


 


Chloride Level  108 MEQ/L  





  ()  


 


Calcium Level  8.4 MG/DL  





  (8.5-10.1)  








Imaging





Last Impressions








Chest X-Ray 8/12/17 2611 Signed





Impressions: 





 Service Date/Time:  Saturday, August 12, 2017 09:48 - CONCLUSION: No acute 





 disease.       Adam Pugh MD 








PE at Discharge


GENERAL: Well developed in no acute distress. 


SKIN: No rashes, ecchymoses or lesions. Warm and dry.


HEAD: Atraumatic. Normocephalic. No temporal or scalp tenderness.


EYES: Pupils equal round and reactive. Extraocular motions intact. No scleral 

icterus. No injection or drainage. 


ENT: Nose without bleeding or purulent drainage. Throat without erythema, 

tonsillar hypertrophy or exudate. Uvula midline. Airway patent.


NECK: Trachea midline. No lymphadenopathy. Supple, nontender, no meningeal 

signs.


CARDIOVASCULAR: Regular rate and rhythm. no murmurs. 


RESPIRATORY: Clear to auscultation. Breath sounds equal bilaterally. No wheezes

, rales, or rhonchi.  


GASTROINTESTINAL: Abdomen soft, non-tender, nondistended. No hepato-splenomegaly

, or palpable masses. No guarding.


MUSCULOSKELETAL: Extremities without clubbing or cyanosis. Trace bilateral 

lower extremity edema.  No joint tenderness, effusion, or edema noted. No calf 

tenderness. 


NEUROLOGICAL: Awake and alert. Able to move all extremities.  No focal 

neurologic findings appreciated.  Normal speech.


Hospital Course








This is a pleasant 77 y/o male who came to ER after Defibrillator firing in am 

yesterday. 


Seen by Cardiology specialist Doctor Nichole Horne, with Diagnosis of 

Complex Ventricular arrhythmias, received a defibrillator shock 


Chronic Atrial Fibrillation, now sinus rhythm, after ICD shock, Hypertension, 

PUD, COPD, chronic Warfarin therapy. 


Admitted and started on Amiodarone drip, he will change to by mouth amiodarone, 

the Defibrillator was reprogrammed his ICD


to a DDD mode with lower rate limit of 70 pulses per minute a monitoring zone 

was also put on 130 beats per minute, keep potassium


above 4.5 Magnesium level above 2. Seen in his bedroom, discussed with patient 

and his Nurse Miss Medel his INR 1.8 subtherapeutic in 


a patient with CHADS-VASc Score of 4 will need to receive LMWH 1mg per kilogram 

every 12 hours SQ starting today. 


stable in his bedroom, no nausea, vomit or diarrhea, and sinus rhythm on 

monitor at this time. 





08/14: Stable in his bedroom, discussed with patient and nurse Miss Reyssica, as 

per Cardiology specialist to continue 


Warfarin today with INR 1.9 and Sinus rhythm, okay to increase the dose of 

Warfarin and follow up tomorrow for PT and INR


Discontinued Digoxin and given low dose Beta blockers with Amiodarone 800 mg 

for one more week then 400 mg day for two more weeks.


and follow during the week with Cardiology specialist. no nausea, vomit or 

diarrhea. no chest pain, and continue sinus rhythm.





Assessment and Plan


77 yo male with a past medical history significant for atrial fibrillation on 

Coumadin, hypertension, CHF, GERD, PUD and asthma who presents to [facility] ED with complaints of lightheadedness, dizziness and irregular heart 

beats for the past month that have increased in intensity and frequency over 

the past 2 days with associated defibrillator firing earlier today.





Ventricular tachycardia


Defibrillator discharge


Seen by Cardiology specialist Doctor Nichole Horne, with Diagnosis of 

Complex Ventricular arrhythmias, received a defibrillator shock 


Chronic Atrial Fibrillation, now sinus rhythm, after ICD shock, Hypertension, 

PUD, COPD, chronic Warfarin therapy. 


Admitted and started on Amiodarone drip, he will change to by mouth amiodarone, 

the Defibrillator was reprogrammed his ICD


to a DDD mode with lower rate limit of 70 pulses per minute a monitoring zone 

was also put on 130 beats per minute, keep potassium


above 4.5 Magnesium level above 2. Seen in his bedroom, discussed with patient 

and his Nurse Miss Medel his INR 1.8 subtherapeutic in 


a patient with CHADS-VASc Score of 4 will need to receive LMWH 1mg per kilogram 

every 12 hours SQ starting today. 


stable in his bedroom, no nausea, vomit or diarrhea, and sinus rhythm on 

monitor at this time. at this time seen by Cardiology specialist


and recommended for discharge and follow in his office, will continue with 

Amiodarone 800 mg daily for one week then 400 mg daily for


two more weeks, follow in his office in one week. and continue low dose of Beta 

blockers


today INR 1.9





Atrial fibrillation on Coumadin


CHF, not in acute exacerbation


Nonischemic cardiomyopathy


HTN


Continue on home dose of Coumadin.  INR 1.9 discussed with Pharmacy due to CHADS

-VASc score of 4 started on Lovenox bridging


today recommended by Cardiology to discharge on increased dose of Warfarin and 

follow with PCP in two days to continue titration of the 


medication, PT and INR. 


Discontinue Digoxin, continue low dose of Beta Blocker and continue Amiodarone 

800 mg daily for one week and then 400 mg daily for two 


more weeks. 


Hold home Torsemide and Aldactone


monitor I&Os


last echocardiogram in the system dated 1/13/14 revealed severely reduced EF 25-

30%, diffuse hypokinesis.  Patient states he recently had echocardiogram done 

on Wednesday at his cardiologist office.





Asthma, not in acute exacerbation


Duonebs as needed





HLD


Continue home Lipitor 10mg daily





DVT prophylaxis


Patient is on Coumadin Lovenox





Discussed with patient and nurse Miss Saleh, all questions answered to the 

best of my abilities. 


Code Status


Full Code


Discharge Planning


Discharge home today.


Pt Condition on Discharge:  Good


Discharge Disposition:  Discharge Home


Discharge Time:  <= 30 minutes


Discharge Instructions


DIET: Follow Instructions for:  Heart Healthy Diet


Activities you can perform:  Regular-No Restrictions








Cosmo Aranda MD Aug 14, 2017 11:16

## 2017-08-14 NOTE — EKG
Date Performed: 08/12/2017       Time Performed: 11:26:36

 

PTAGE:      76 years

 

EKG:      UNCERTAIN IRREGULAR RHYTHM ELECTRONIC VENTRICULAR PACEMAKER -- CONTOUR ANALYSIS BASED ON IN
TRINSIC RHYTHM INFERIOR MYOCARDIAL INFARCTION MODERATE T-WAVE ABNORMALITY, CONSIDER ANTEROLATERAL ISC
HEMIA Paced rhythm is new since prior tracing ABNORMAL ECG

 

PREVIOUS TRACING       : 08/12/2017 09.53

 

DOCTOR:   Buddy Lee  Interpretating Date/Time  08/14/2017 09:58:39

## 2017-08-14 NOTE — HHI.PR
Subjective


Remarks


This is a pleasant 75 y/o male who came to ER after Defibrillator firing in am 

yesterday. 


Seen by Cardiology specialist Doctor Nichole Horne, with Diagnosis of 

Complex Ventricular arrhythmias, received a defibrillator shock 


Chronic Atrial Fibrillation, now sinus rhythm, after ICD shock, Hypertension, 

PUD, COPD, chronic Warfarin therapy. 


Admitted and started on Amiodarone drip, he will change to by mouth amiodarone, 

the Defibrillator was reprogrammed his ICD


to a DDD mode with lower rate limit of 70 pulses per minute a monitoring zone 

was also put on 130 beats per minute, keep potassium


above 4.5 Magnesium level above 2. Seen in his bedroom, discussed with patient 

and his Nurse  Lizy his INR 1.8 subtherapeutic in 


a patient with CHADS-VASc Score of 4 will need to receive LMWH 1mg per kilogram 

every 12 hours SQ starting today. 


stable in his bedroom, no nausea, vomit or diarrhea, and sinus rhythm on 

monitor at this time. 





08/14: Stable in his bedroom, discussed with patient and nurse Miss Saleh, as 

per Cardiology specialist to continue 


Warfarin today with INR 1.9 and Sinus rhythm, okay to increase the dose of 

Warfarin and follow up tomorrow for PT and INR


Discontinued Digoxin and given low dose Beta blockers with Amiodarone 800 mg 

for one more week then 400 mg day for two more weeks.


and follow during the week with Cardiology specialist. no nausea, vomit or 

diarrhea. no chest pain, and continue sinus rhythm.





Objective





 Vital Signs








  Date Time  Temp Pulse Resp B/P Pulse Ox O2 Delivery O2 Flow Rate FiO2


 


8/14/17 08:00  82      


 


8/14/17 07:15  69  130/80    


 


8/14/17 07:00  69      


 


8/14/17 07:00 97.4 69 18 130/80 98   


 


8/14/17 06:20  89      


 


8/14/17 05:28  68      


 


8/14/17 04:20  68      


 


8/14/17 03:30 97.7 70 17 115/72 99   


 


8/14/17 03:20  69      


 


8/14/17 02:10  74      


 


8/14/17 01:11  69      


 


8/14/17 00:44  69      


 


8/13/17 23:40 97.8 68 16 128/85 97   


 


8/13/17 23:37  70      


 


8/13/17 22:00  68      


 


8/13/17 21:00  68      


 


8/13/17 20:10  69      


 


8/13/17 19:59  69      


 


8/13/17 19:59 97.3 73 16 128/73 97   


 


8/13/17 18:00  69      


 


8/13/17 17:00  69      


 


8/13/17 16:00  69      


 


8/13/17 15:00  69      


 


8/13/17 15:00 98.8 69 20 159/95 98   


 


8/13/17 14:00  69      


 


8/13/17 13:00  69      


 


8/13/17 12:00  69      


 


8/13/17 11:00 98.2 70 20 124/72 98   


 


8/13/17 11:00  69      








 I/O








 8/13/17 8/13/17 8/13/17 8/14/17 8/14/17 8/14/17





 07:00 15:00 23:00 07:00 15:00 23:00


 


Intake Total   1320 ml 480 ml  


 


Output Total   1320 ml   


 


Balance   0 ml 480 ml  


 


      


 


Intake Oral   1320 ml 480 ml  


 


Output Urine Total   1320 ml   


 


# Voids    3  


 


# Bowel Movements   2 2  








Result Diagram:  


8/13/17 0639                                                                   

             8/14/17 0536





Imaging





Last Impressions








Chest X-Ray 8/12/17 0949 Signed





Impressions: 





 Service Date/Time:  Saturday, August 12, 2017 09:48 - CONCLUSION: No acute 





 disease.       Adam Pugh MD 








Procedures


None


Other Results





 Laboratory Tests








Test 8/12/17 8/12/17 8/13/17 8/14/17





 09:55 18:50 06:39 05:30


 


Digoxin Level 0.6 NG/ML   


 


Total Creatine Kinase  87 U/L  


 


Troponin I  0.05 NG/ML  


 


Thyroid Stimulating Hormone  1.340 uIU/ML  





3rd Gen    


 


White Blood Count   8.0 TH/MM3 


 


Red Blood Count   3.97 MIL/MM3 


 


Hemoglobin   13.6 GM/DL 


 


Hematocrit   39.9 % 


 


Mean Corpuscular Volume   100.6 FL 


 


Mean Corpuscular Hemoglobin   34.3 PG 


 


Mean Corpuscular Hemoglobin   34.2 % 





Concent    


 


Red Cell Distribution Width   12.8 % 


 


Platelet Count   255 TH/MM3 


 


Mean Platelet Volume   8.4 FL 


 


Neutrophils (%) (Auto)   67.4 % 


 


Lymphocytes (%) (Auto)   19.3 % 


 


Monocytes (%) (Auto)   11.3 % 


 


Eosinophils (%) (Auto)   1.1 % 


 


Basophils (%) (Auto)   0.9 % 


 


Neutrophils # (Auto)   5.4 TH/MM3 


 


Lymphocytes # (Auto)   1.6 TH/MM3 


 


Monocytes # (Auto)   0.9 TH/MM3 


 


Eosinophils # (Auto)   0.1 TH/MM3 


 


Basophils # (Auto)   0.1 TH/MM3 


 


CBC Comment   DIFF FINAL  


 


Differential Comment     


 


Prothrombin Time    21.7 SEC


 


Prothromb Time International    1.9 RATIO





Ratio    


 


    





Test 8/14/17   





 05:36   


 


Sodium Level 141 MEQ/L   


 


Potassium Level 4.2 MEQ/L   


 


Chloride Level 106 MEQ/L   


 


Carbon Dioxide Level 26.5 MEQ/L   


 


Anion Gap 9 MEQ/L   


 


Blood Urea Nitrogen 13 MG/DL   


 


Creatinine 1.22 MG/DL   


 


Estimat Glomerular Filtration 58 ML/MIN   





Rate    


 


Random Glucose 98 MG/DL   


 


Calcium Level 8.7 MG/DL   


 


Phosphorus Level 2.9 MG/DL   


 


Magnesium Level 2.0 MG/DL   








Objective Remarks


GENERAL: Well developed in no acute distress. 


SKIN: No rashes, ecchymoses or lesions. Warm and dry.


HEAD: Atraumatic. Normocephalic. No temporal or scalp tenderness.


EYES: Pupils equal round and reactive. Extraocular motions intact. No scleral 

icterus. No injection or drainage. 


ENT: Nose without bleeding or purulent drainage. Throat without erythema, 

tonsillar hypertrophy or exudate. Uvula midline. Airway patent.


NECK: Trachea midline. No lymphadenopathy. Supple, nontender, no meningeal 

signs.


CARDIOVASCULAR: Regular rate and rhythm. no murmurs. 


RESPIRATORY: Clear to auscultation. Breath sounds equal bilaterally. No wheezes

, rales, or rhonchi.  


GASTROINTESTINAL: Abdomen soft, non-tender, nondistended. No hepato-splenomegaly

, or palpable masses. No guarding.


MUSCULOSKELETAL: Extremities without clubbing or cyanosis. Trace bilateral 

lower extremity edema.  No joint tenderness, effusion, or edema noted. No calf 

tenderness. 


NEUROLOGICAL: Awake and alert. Able to move all extremities.  No focal 

neurologic findings appreciated.  Normal speech.


Medications and IVs





 Current Medications








 Medications


  (Trade)  Dose


 Ordered  Sig/Rosibel


 Route  Start Time


 Stop Time Status Last Admin


 


  (NS Flush)  2 ml  UNSCH  PRN


 IV FLUSH  8/12/17 11:30


     


 


 


  (NS Flush)  2 ml  BID


 IV FLUSH  8/12/17 21:00


    8/14/17 09:35


 


 


  (Tylenol)  650 mg  Q4H  PRN


 PO  8/12/17 11:30


    8/13/17 14:35


 


 


  (Zofran Inj)  4 mg  Q6H  PRN


 IVP  8/12/17 11:30


    8/13/17 16:46


 


 


  (Narcan Inj)  0.4 mg  UNSCH  PRN


 IV  8/12/17 11:30


     


 


 


  (Mer-Colace)  1 tab  BID


 PO  8/12/17 21:00


    8/13/17 08:38


 


 


  (Milk Of


 Magnesia Liq)  30 ml  Q12H  PRN


 PO  8/12/17 11:30


     


 


 


  (Senokot)  17.2 mg  Q12H  PRN


 PO  8/12/17 11:30


     


 


 


  (Dulcolax Supp)  10 mg  DAILY  PRN


 RECTAL  8/12/17 11:30


     


 


 


  (Lactulose Liq)  30 ml  DAILY  PRN


 PO  8/12/17 11:30


     


 


 


 Atorvastatin


 Calcium 10 mg  10 mg  HS


 PO  8/12/17 21:00


    8/13/17 21:06


 


 


  (Coumadin


 Consult Pharmacy)  0 ml @ 0


 mls/hr  UNSCH


 OTHER  8/12/17 13:00


     


 


 


  (Ambien)  5 mg  HS  PRN


 PO  8/12/17 16:45


    8/13/17 21:06


 


 


  (Cordarone)  400 mg  BID


 PO  8/12/17 21:00


 8/14/17 22:00  8/14/17 09:33


 


 


  (Mag-Ox)  400 mg  BID


 PO  8/13/17 21:00


    8/14/17 09:33


 


 


  (Coumadin)  4 mg  DAILY@1600


 PO  8/13/17 16:00


    8/13/17 15:57


 


 


  (Lanoxin)  0.125 mg  [id number]


 PO  8/15/17 21:00


     


 


 


  (Aldactone)  25 mg  BID


 PO  8/14/17 21:00


     


 


 


  (Toprol Xl)  50 mg  HS


 PO  8/14/17 21:00


     


 


 


  (Prinivil)  10 mg  DAILY


 PO  8/15/17 09:00


     


 


 


  (Cordarone)  400 mg  DAILY


 PO  8/15/17 09:00


 8/28/17 09:01   


 


 


  (Cordarone)  200 mg  DAILY


 PO  8/29/17 09:00


     


 











A/P


Assessment and Plan


75 yo male with a past medical history significant for atrial fibrillation on 

Coumadin, hypertension, CHF, GERD, PUD and asthma who presents to [facility] ED with complaints of lightheadedness, dizziness and irregular heart 

beats for the past month that have increased in intensity and frequency over 

the past 2 days with associated defibrillator firing earlier today.





Ventricular tachycardia


Defibrillator discharge


Seen by Cardiology specialist Doctor Nichole Horne, with Diagnosis of 

Complex Ventricular arrhythmias, received a defibrillator shock 


Chronic Atrial Fibrillation, now sinus rhythm, after ICD shock, Hypertension, 

PUD, COPD, chronic Warfarin therapy. 


Admitted and started on Amiodarone drip, he will change to by mouth amiodarone, 

the Defibrillator was reprogrammed his ICD


to a DDD mode with lower rate limit of 70 pulses per minute a monitoring zone 

was also put on 130 beats per minute, keep potassium


above 4.5 Magnesium level above 2. Seen in his bedroom, discussed with patient 

and his Nurse Miss Medel his INR 1.8 subtherapeutic in 


a patient with CHADS-VASc Score of 4 will need to receive LMWH 1mg per kilogram 

every 12 hours SQ starting today. 


stable in his bedroom, no nausea, vomit or diarrhea, and sinus rhythm on 

monitor at this time. at this time seen by Cardiology specialist


and recommended for discharge and follow in his office, will continue with 

Amiodarone 800 mg daily for one week then 400 mg daily for


two more weeks, follow in his office in one week. and continue low dose of Beta 

blockers


today INR 1.9





Atrial fibrillation on Coumadin


CHF, not in acute exacerbation


Nonischemic cardiomyopathy


HTN


Continue on home dose of Coumadin.  INR 1.9 discussed with Pharmacy due to CHADS

-VASc score of 4 started on Lovenox bridging


today recommended by Cardiology to discharge on increased dose of Warfarin and 

follow with PCP in two days to continue titration of the 


medication, PT and INR. 


Discontinue Digoxin, continue low dose of Beta Blocker and continue Amiodarone 

800 mg daily for one week and then 400 mg daily for two 


more weeks. 


Hold home Torsemide and Aldactone


monitor I&Os


last echocardiogram in the system dated 1/13/14 revealed severely reduced EF 25-

30%, diffuse hypokinesis.  Patient states he recently had echocardiogram done 

on Wednesday at his cardiologist office.





Asthma, not in acute exacerbation


Duonebs as needed





HLD


Continue home Lipitor 10mg daily





DVT prophylaxis


Patient is on Coumadin Lovenox





Discussed with patient and nurse Miss Saleh, all questions answered to the 

best of my abilities. 


Code Status


Full Code


Discharge Planning


Discharge home today.








Cosmo Aranda MD Aug 14, 2017 10:49
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
## 2018-04-30 NOTE — RADRPT
EXAM DATE/TIME:  04/30/2018 13:23 

 

[facility] COMPARISON:     

No previous studies available for comparison.

 

                     

INDICATIONS :     

Pain in arch of right foot, no known injury.

                     

 

MEDICAL HISTORY :            

Cardiovascular disease. Congestive heart failure. Hypertension   

 

SURGICAL HISTORY :     

Pacemaker.   

 

ENCOUNTER:     

Initial                                        

 

ACUITY:     

1 day      

 

PAIN SCORE:     

7/10

 

LOCATION:     

Right  arch of foot

 

FINDINGS:     

Two view examination of the right foot demonstrates soft tissue swelling in the plantar midfoot.  The
 osseous structures appear intact.  No radiopaque foreign bodies. Bony mineralization is normal.

 

CONCLUSION:     

1. Mild soft tissue swelling in the plantar midfoot without radiopaque foreign bodies or underlying o
sseous abnormality.

 

 

 

 August Fernandes MD on April 30, 2018 at 14:21           

Board Certified Radiologist.

 This report was verified electronically.

## 2018-04-30 NOTE — PD
HPI


Chief Complaint:  Edema


Time Seen by Provider:  12:29


Travel History


International Travel<30 days:  No


Contact w/Intl Traveler<30days:  No


Traveled to known affect area:  No





History of Present Illness


HPI


This patient went to an urgent care center because he had pain in his right 

leg.  He was sent here to rule out DVT.  He has no history of DVT.  He does 

take Eliquis for A. fib.  He has had 3 days of pain behind his right knee and 

also has some pain at the lateral border of his right foot.  No injury.  

Symptom severity is mild.  No alleviating factors.  He does have some mild 

symmetric swelling in both lower legs.  He is on diuretics.  No alleviating 

factors.  No exacerbating factors.





PFSH


Past Medical History


Hx Anticoagulant Therapy:  Yes (ELIQUIS)


Heart Rhythm Problems:  Yes


Cancer:  No


Cardiovascular Problems:  Yes (PACER AND DEFIB    CATH)


High Cholesterol:  No


Chemotherapy:  No


Chest Pain:  Yes


Congestive Heart Failure:  Yes


Cerebrovascular Accident:  No


Coronary Artery Disease:  Yes (PACER/DEFIB)


Diabetes:  No


Diminished Hearing:  No


Endocrine:  No


Gastrointestinal Disorders:  Yes


GERD:  Yes


Glaucoma:  No


Genitourinary:  No


Hepatitis:  No


Hiatal Hernia:  No


Hypertension:  Yes


Implanted Vascular Access Dvce:  Yes


Musculoskeletal:  Yes


Neurologic:  No


Psychiatric:  No


Reproductive:  No


Respiratory:  Yes (ASTHMA)


Integumentary:  No


Radiation Therapy:  No


Seizures:  No


Thyroid Disease:  No


Ulcer:  Yes





Past Surgical History


Abdominal Surgery:  No


AICD:  Yes


Cardiac Surgery:  Yes (PACE/DEFIB)


Eye Surgery:  Yes (BILAT CATARACT)


Genitourinary Surgery:  No


Prostatectomy:  Yes ("SCRAPED IT")


Thoracic Surgery:  No


Other Surgery:  Yes (SINUS)





Social History


Alcohol Use:  Yes (OCC)


Tobacco Use:  No


Substance Use:  No





Allergies-Medications


(Allergen,Severity, Reaction):  


Coded Allergies:  


     nitrofurantoin (Unverified  Allergy, Intermediate, Itching, 4/30/18)


Uncoded Allergies:  


     ANTIBIOTIC (Adverse Reaction, Intermediate, Rash, 8/12/17)


 PT STATE HE HAS A REACTION TO AN ANTIBIOTIC BUT CANNOT


 REMEMBER THE NAME


Reported Meds & Prescriptions





Reported Meds & Active Scripts


Active


Reported


Finasteride 5 Mg Tab 5 Mg PO DAILY


     Do not crush.


Omeprazole 20 Mg Tab 20 Mg PO DAILY


Torsemide 5 Mg Tab Unknown Dose PO DAILY


Eliquis (Apixaban) 2.5 Mg Tab 2.5 Mg PO BID


Spironolactone 25 Mg Tab 25 Mg PO DAILY


Atorvastatin (Atorvastatin Calcium) 10 Mg Tab 10 Mg PO HS


Magnesium Oxide 400 Mg Tab 400 Mg PO DAILY


Fosinopril (Fosinopril Sodium) 10 Mg Tab 10 Mg PO DAILY








Review of Systems


General / Constitutional:  No: Fever


Eyes:  No: Visual changes


HENT:  No: Headaches


Cardiovascular:  Positive: Edema, No: Chest Pain or Discomfort


Respiratory:  No: Shortness of Breath


Gastrointestinal:  No: Abdominal Pain


Genitourinary:  No: Dysuria


Musculoskeletal:  Positive: Edema, Pain


Skin:  No Rash


Neurologic:  No: Weakness


Psychiatric:  No: Depression


Endocrine:  No: Polydipsia


Hematologic/Lymphatic:  No: Easy Bruising





Physical Exam


Narrative


GENERAL: Well-nourished, well-developed patient in no apparent distress.


SKIN: Focused skin assessment reveals no rash and nodules. Skin is Warm and dry.


HEAD: Atraumatic. Normocephalic. 


EYES: Pupils equal and round. No scleral icterus. No injection or drainage. 


ENT: No nasal bleeding or discharge.  Mucous membranes pink and moist.


NECK: Trachea midline. No JVD. 


CARDIOVASCULAR: Regular rate and rhythm.  No murmur appreciated.


RESPIRATORY: No accessory muscle use. Clear to auscultation. Breath sounds 

equal bilaterally. 


GASTROINTESTINAL: Abdomen soft, non-tender, nondistended. Hepatic and splenic 

margins not palpable. 


MUSCULOSKELETAL: No obvious deformities. No clubbing.  No cyanosis.  Trace 

symmetric edema in the ankles and lower legs and feet.  No objective 

abnormalities of the foot or leg exam beyond trace edema.


NEUROLOGICAL: Awake and alert. No obvious cranial nerve deficits.  Motor 

grossly within normal limits. Normal speech.


PSYCHIATRIC: Appropriate mood and affect; insight and judgment normal.





Data


Data


Last Documented VS





Vital Signs








  Date Time  Temp Pulse Resp B/P (MAP) Pulse Ox O2 Delivery O2 Flow Rate FiO2


 


4/30/18 11:53 98.9 70 16 132/74 (93) 96   








Orders





 Orders


Us Leg Venous Doppler (4/30/18 )


Foot, Limited (2vws) (4/30/18 )








[facility]


Medical Decision Making


Medical Screen Exam Complete:  Yes


Emergency Medical Condition:  Yes


Medical Record Reviewed:  Yes


Differential Diagnosis


DVT, muscle strain, stress fracture


Narrative Course


I have reviewed the patient's electronic medical record.  I reviewed the urgent 

care dictation.  They mentioned they were concerned about PAD.  However he has 

readily palpable pulses in both feet and no evidence of ischemic changes on 

exam.  They wanted to rule out DVT





My clinical suspicion is low.  Plus he is already on the treatment.  However I 

have ordered a right leg ultrasound and a right foot x-ray





Ultrasound is negative for DVT. I reviewed the right foot x-rays and I do not 

see an acute fracture





He has 2 separate nonspecific musculoskeletal type pains.  No indication of 

emergency condition





Diagnosis





 Primary Impression:  


 Pain of right leg


 Additional Impression:  


 Right foot pain





***Additional Instructions:  


The patient was advised to follow up with their physician and return if they 

worsen.


***Med/Other Pt SpecificInfo:  Other


Disposition:  01 DISCHARGE HOME


Condition:  Stable











Troy Lin MD Apr 30, 2018 12:41

## 2018-04-30 NOTE — RADRPT
EXAM DATE/TIME:  04/30/2018 13:39 

 

[facility] COMPARISON:     

No previous studies available for comparison.

        

 

 

INDICATIONS :                

Right leg swelling and pain. 

            

 

MEDICAL HISTORY :     

Hypertension.  Gastroesophageal reflux disease.    Congestive heart failure. Coronary artery disease.
 Anticoagulant therapy, Eliquis. Ulcer. 

 

SURGICAL HISTORY :     

Pacemaker.    Cardiac catheterization. Sinus surgery. Prostate surgery. 

 

ENCOUNTER:     

Initial

 

ACUITY:     

1 day

 

PAIN SCORE:      

4/10

 

LOCATION:      

Right  leg. 

            

                      

 

TECHNIQUE:     

Venous ultrasound of the leg was performed from the inguinal ligament to the proximal calf.  Real-mushtaq
e, color Doppler and spectral tracing, compression and augmentation techniques were used.  

 

FINDINGS:     

There is normal compressibility of the deep venous system from the inguinal region to the proximal ca
lf.  No echogenic clot is seen in the lumen of the common femoral, femoral, popliteal, and posterior 
tibial veins.  There is a normal response of the venous system to proximal and distal augmentation an
d respiration.  

 

CONCLUSION:     

No DVT is identified within the right lower extremity.

 

 

 

 Tibrucio Ray MD on April 30, 2018 at 14:03           

Board Certified Radiologist.

 This report was verified electronically.

## 2018-06-09 ENCOUNTER — HOSPITAL ENCOUNTER (EMERGENCY)
Dept: HOSPITAL 17 - PHEFT | Age: 78
Discharge: HOME | End: 2018-06-09
Payer: COMMERCIAL

## 2018-06-09 VITALS
OXYGEN SATURATION: 98 % | TEMPERATURE: 97.8 F | DIASTOLIC BLOOD PRESSURE: 81 MMHG | RESPIRATION RATE: 16 BRPM | SYSTOLIC BLOOD PRESSURE: 147 MMHG | HEART RATE: 98 BPM

## 2018-06-09 VITALS — WEIGHT: 211.64 LBS | BODY MASS INDEX: 35.26 KG/M2 | HEIGHT: 65 IN

## 2018-06-09 DIAGNOSIS — I25.10: ICD-10-CM

## 2018-06-09 DIAGNOSIS — I11.0: ICD-10-CM

## 2018-06-09 DIAGNOSIS — I50.9: ICD-10-CM

## 2018-06-09 DIAGNOSIS — J45.909: ICD-10-CM

## 2018-06-09 DIAGNOSIS — I48.91: ICD-10-CM
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
## 2018-06-09 DIAGNOSIS — S70.11XA: Primary | ICD-10-CM

## 2018-06-09 DIAGNOSIS — Z79.01: ICD-10-CM

## 2018-06-09 DIAGNOSIS — Z95.810: ICD-10-CM

## 2018-06-09 DIAGNOSIS — K21.9: ICD-10-CM

## 2018-06-09 PROCEDURE — 99282 EMERGENCY DEPT VISIT SF MDM: CPT

## 2018-06-09 NOTE — PD
HPI


Chief Complaint:  Fall


Time Seen by Provider:  15:24


Travel History


International Travel<30 days:  No


Contact w/Intl Traveler<30days:  No


Traveled to known affect area:  No





History of Present Illness


HPI


77-year-old male presents to the emergency department for evaluation swelling 

and bruising to his right lateral distal thigh after tripping over the shower 

ledge this morning and falling.  He takes Eliquis and is concerned of the 

swelling and bruising.  Says he hit his leg on the sink during the fall.  He 

denies hitting his head or loss of consciousness.  Denies neck pain or back 

pain.  Denies headache, lightheadedness, dizziness, change in mentation, 

confusion, disorientation, slurred speech, focal deficits or weakness.  His 

daughter is here at the bedside also and she reports he has had normal activity 

throughout the day and there has been no change in mentation.  She said he came 

up this morning, ate breakfast, went to the [employer], and then when he 

went home he called her with concern of the hematoma on his leg only.  Denies 

paresthesias, loss of sensation, decreased range of motion, decreased strength 

to the affected extremity.  Has been ambulatory on the affected extremity.  

Denies chest pain, shortness of breath, abdominal pain.  Rates pain 2/10.  

Worse with touch to the area.  Better without touching the area.  Primary care 

provider is Dr. Castillo.  Allergies to nitrofurantoin.  History of atrial 

fibrillation and hypertension.  Has defibrillator and pacemaker.  Has no other 

medical complaints.  No other modifying factors or associated signs and 

symptoms.





PFSH


Past Medical History


Hx Anticoagulant Therapy:  Yes


Heart Rhythm Problems:  Yes


Cancer:  No


Cardiovascular Problems:  Yes


High Cholesterol:  No


Chemotherapy:  No


Chest Pain:  Yes


Congestive Heart Failure:  Yes


Cerebrovascular Accident:  No


Coronary Artery Disease:  Yes (PACER/DEFIB)


Diabetes:  No


Diminished Hearing:  No


Endocrine:  No


Gastrointestinal Disorders:  Yes


GERD:  Yes


Glaucoma:  No


Genitourinary:  No


Hepatitis:  No


Hiatal Hernia:  No


Hypertension:  Yes


Implanted Vascular Access Dvce:  Yes


Musculoskeletal:  Yes


Neurologic:  No


Psychiatric:  No


Reproductive:  No


Respiratory:  Yes (ASTHMA)


Integumentary:  No


Radiation Therapy:  No


Seizures:  No


Thyroid Disease:  No


Ulcer:  Yes





Past Surgical History


Abdominal Surgery:  No


AICD:  Yes


Cardiac Surgery:  Yes (PACE/DEFIB)


Eye Surgery:  Yes (BILAT CATARACT)


Genitourinary Surgery:  No


Prostatectomy:  Yes ("SCRAPED IT")


Thoracic Surgery:  No


Other Surgery:  Yes (SINUS)





Social History


Alcohol Use:  Yes (OCC)


Tobacco Use:  No


Substance Use:  No





Allergies-Medications


(Allergen,Severity, Reaction):  


Coded Allergies:  


     nitrofurantoin (Unverified  Allergy, Intermediate, Itching, 6/9/18)


Uncoded Allergies:  


     ANTIBIOTIC (Adverse Reaction, Intermediate, Rash, 8/12/17)


 PT STATE HE HAS A REACTION TO AN ANTIBIOTIC BUT CANNOT


 REMEMBER THE NAME


Reported Meds & Prescriptions





Reported Meds & Active Scripts


Active


Reported


Finasteride 5 Mg Tab 5 Mg PO DAILY


     Do not crush.


Omeprazole 20 Mg Tab 20 Mg PO DAILY


Torsemide 5 Mg Tab Unknown Dose PO DAILY


Eliquis (Apixaban) 2.5 Mg Tab 2.5 Mg PO BID


Spironolactone 25 Mg Tab 25 Mg PO DAILY


Atorvastatin (Atorvastatin Calcium) 10 Mg Tab 10 Mg PO HS


Magnesium Oxide 400 Mg Tab 400 Mg PO DAILY


Fosinopril (Fosinopril Sodium) 10 Mg Tab 10 Mg PO DAILY








Review of Systems


Except as stated in HPI:  all other systems reviewed are Neg





Physical Exam


Narrative


GENERAL: Well-nourished, well-developed  patient, in no acute distress


SKIN: Warm and dry.  Approximately 6 cm x 4 cm hematoma noted to the distal, 

lateral, right thigh with an area of surrounding ecchymosis.  The hematoma and 

the area of ecchymosis was marked with a surgical marker.  


HEAD: Atraumatic. Normocephalic.  No facial or scalp abrasions or lacerations 

noted.


EYES: Pupils equal and round at 3 mm with brisk reaction. No scleral icterus. 

No injection or drainage.  No raccoon eyes.


ENT: Mucosa pink and moist. Airway patent.  Nares without nasal blood, purulent 

drainage.  No rhinorrhea.


EARS: Bilateral pinnae and external canals appear within normal limits. 

Bilateral tympanic membranes without  erythema, dullness, hemotympanum or 

perforation.  No otorrhea.  No schreiber signs.


NECK: Moving freely.  Trachea midline.  No lymphadenopathy.  Active rotation of 

the neck greater than 45 left and right.  No midline point tenderness on 

palpation of the cervical spine.  No obvious deformities.  


CHEST:   No retractions or use of accessory muscles.


CARDIOVASCULAR: Regular rate and rhythm.  No murmur appreciated. 


RESPIRATORY: No accessory muscle use. Clear to auscultation. Breath sounds 

equal bilaterally. 


GASTROINTESTINAL: Abdomen soft, non-tender, nondistended. Hepatic and splenic 

margins not palpable.  Bowel sounds are active 4 quadrants.  


MUSCULOSKELETAL: Right lower extremity is supple nontender with 2+ pedal pulse 

and sensory intact; full range of motion and strength.  No obvious deformities. 

No clubbing.  No cyanosis.  No edema.  


BACK: No midline point tenderness on palpation of the lumbar or thoracic spine.

  No obvious deformities.  Patient sitting up in bed at 90.  Ambulatory with a 

normal gait.


NEUROLOGICAL: Awake and alert.  Oriented 3.  No obvious cranial nerve 

deficits.  Motor grossly within normal limits. Normal speech.  No midline 

drift.  No ataxia.  Moves all extremities.  5/5 strength to all extremities.  

Sensory intact.


PSYCHIATRIC: Appropriate mood and affect; insight and judgment normal.





Data


Data


Last Documented VS





Vital Signs








  Date Time  Temp Pulse Resp B/P (MAP) Pulse Ox O2 Delivery O2 Flow Rate FiO2


 


6/9/18 15:14 97.8 98 16 147/81 (103) 98   








Orders





 Orders


Ed Discharge Order (6/9/18 15:42)








MDM


Medical Decision Making


Medical Screen Exam Complete:  Yes


Emergency Medical Condition:  Yes


Medical Record Reviewed:  Yes


Differential Diagnosis


Hematoma, contusion, medical clearance


Narrative Course


77-year-old male went to his right lateral distal thigh from hitting it on a 

sink during a mechanical fall this morning approximately 8 AM.  He is on 

Eliquis.  He denies hitting his head or loss of consciousness.  Denies neck 

pain or back pain.  No focal deficits or weakness.  The area of the hematoma 

and bruising was marked with a surgical marker to comfort the patient and the 

patient's daughter so they can evaluate the area.  Instructed patient to follow 

up with primary care provider.  Patient verbalizes understanding and agreement 

with treatment plan.  Patient is medically cleared and stable for discharge.  

Discussed reasons to return to the emergency department.  Patient agrees with 

treatment plan.  The patients vital signs are stable and the patient is stable 

for outpatient follow-up and treatment.  Patient discharged home, stable and in 

no acute distress.





Diagnosis





 Primary Impression:  


 Hematoma of right thigh


 Qualified Codes:  S70.11XA - Contusion of right thigh, initial encounter


Referrals:  


Primary Care Physician


Patient Instructions:  Fall Prevention (ED), General Instructions, Hematoma (ED)





***Additional Instructions:  


Tylenol as directed and as needed for pain


Heating pad and/or ice to affected area to reduce pain/inflammation


Avoid aggravating activities; increase activity as tolerated


Follow-up with primary care provider


Return to emergency department immediately with worsening of symptoms


***Med/Other Pt SpecificInfo:  No Change to Meds, No Meds Exist/No RX given


Disposition:  01 DISCHARGE HOME


Condition:  Stable











Kaitlyn Bishop Jun 9, 2018 15:42